# Patient Record
Sex: MALE | Race: WHITE | Employment: FULL TIME | URBAN - METROPOLITAN AREA
[De-identification: names, ages, dates, MRNs, and addresses within clinical notes are randomized per-mention and may not be internally consistent; named-entity substitution may affect disease eponyms.]

---

## 2021-01-11 ENCOUNTER — HOSPITAL ENCOUNTER (OUTPATIENT)
Dept: PHYSICAL THERAPY | Age: 36
Discharge: HOME | End: 2021-01-11
Payer: COMMERCIAL

## 2021-01-11 PROCEDURE — 97110 THERAPEUTIC EXERCISES: CPT | Performed by: PHYSICAL THERAPIST

## 2021-01-11 PROCEDURE — 97161 PT EVAL LOW COMPLEX 20 MIN: CPT | Performed by: PHYSICAL THERAPIST
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
## 2021-01-18 ENCOUNTER — HOSPITAL ENCOUNTER (OUTPATIENT)
Dept: PHYSICAL THERAPY | Age: 36
Discharge: HOME | End: 2021-01-18
Payer: COMMERCIAL

## 2021-01-18 PROCEDURE — 97140 MANUAL THERAPY 1/> REGIONS: CPT | Performed by: PHYSICAL THERAPIST

## 2021-01-18 PROCEDURE — 97110 THERAPEUTIC EXERCISES: CPT | Performed by: PHYSICAL THERAPIST

## 2021-01-18 NOTE — PROGRESS NOTES
PT DAILY TREATMENT NOTE     Patient Name: Evan Merino  Date:2021  : 1985  [x]  Patient  Verified  Payor: Wong Rey / Plan: [employer] / Product Type: HMO /    In time:1118am  Out time:1218  Total Treatment Time (min): 60  Total Timed Codes (min): 50  1:1 Treatment Time (min): 45   Visit #: 2 of     Treatment Area: Thoracic spine pain [M54.6]    SUBJECTIVE  Pain Level (0-10 scale): tight  Any medication changes, allergies to medications, adverse drug reactions, diagnosis change, or new procedure performed?: [x] No    [] Yes (see summary sheet for update)  Subjective functional status/changes:   [] No changes reported  When I stretch that LS mm it feels like it pops when I come back up. Everything is on the r side.      OBJECTIVE  Modality rationale: decrease inflammation, decrease pain, increase tissue extensibility and increase muscle contraction/control to improve the patients ability to perform functional mobility and improve activity  endurance     Min Type Additional Details    [] Estim: []Att   []Unatt  []TENS instruct                 []IFC  []Premod []NMES                       []Other:  []w/US   []w/ice   []w/heat  Position:  Location:    []  Traction: [] Cervical       []Lumbar                       [] Prone          []Supine                       []Intermittent   []Continuous Lbs:  [] before manual  [] after manual    []  Ultrasound: []Continuous   [] Pulsed                           []1MHz   []3MHz Location:  W/cm2:    []  Iontophoresis with dexamethasone         Location: [] Take home patch   [] In clinic   10 [x]  Ice     [x]  heat  []  Ice massage Position:  Location:    []  Vasopneumatic Device Pressure: [] lo [] med [] hi   Temp: [] lo [] med [] hi   [] Skin assessment post-treatment:  []intact []redness- no adverse reaction       []redness  adverse reaction:       40/35 min Therapeutic Exercise:  [] See flow sheet :   Rationale: increase ROM, increase strength and improve coordination to improve the patient’s ability to perform functional mobility and improve activity  endurance       min Therapeutic Activity:  []  See flow sheet :   Rationale:        10 min Manual Therapy:  DTM to R rhomboid and UT, LS mm. PA t/s mobes   Rationale: decrease pain, increase ROM, increase tissue extensibility, decrease trigger points and increase postural awareness to perform functional mobility and improve activity  endurance    [The manual therapy interventions were performed at a separate and distinct time from the therapeutic activities interventions]            x min Patient Education: [x] Review HEP    [] Progressed/Changed HEP based on:   [] positioning   [x] body mechanics   [] transfers   [] heat/ice application        Other Objective/Functional Measures: Initiated POC  TTP in R rhomboid and with PA to mid thoracic, R t/s mobilization limited  Added open books for t/s mobilization     Pain Level (0-10 scale) post treatment: tight1    ASSESSMENT/Changes in Function: Good tolerance to treatment today with patient req 100% verbal/tactile cueing and demo for proper form/technique with all newly introduced therex.Pt presents with decreased R t/s rotation and pain with PA to mid t/s area with increased tone to R rhomboids/mid traps/ps mm.  Patient will continue to benefit from skilled PT services to modify and progress therapeutic interventions, address functional mobility deficits, address ROM deficits, address strength deficits, analyze and address soft tissue restrictions, analyze and cue movement patterns, analyze and modify body mechanics/ergonomics and assess and modify postural abnormalities to attain remaining goals.     []  See Plan of Care  []  See progress note/recertification  []  See Discharge Summary         Progress towards goals / Updated goals:  · Short Term Goals: To be accomplished in  2  weeks:  1. Patient will be compliant with HEP for sx management to address the above  listed deficits. Pt reports compliance 1/18/2021     · Long Term Goals: To be accomplished in  8-12  treatments:  1. Patient to be independent & compliant with HEP in preparation for D/C.   2. Patient to increase FOTO score to 85 indicating improved functional abilities and QOL. 3. Patient to increase AROM of c/s to 45deg B side bending and B equal rotation to facilitate ADLS. Patient to report ability to lift children with no increase in sx in neck or t/s.     PLAN  []  Upgrade activities as tolerated     []  Continue plan of care  []  Update interventions per flow sheet       []  Discharge due to:_  []  Other:_      Saniya Davis, PT 1/18/2021  10:06 AM

## 2021-01-21 ENCOUNTER — HOSPITAL ENCOUNTER (OUTPATIENT)
Dept: PHYSICAL THERAPY | Age: 36
Discharge: HOME | End: 2021-01-21
Payer: COMMERCIAL

## 2021-01-21 PROCEDURE — 97110 THERAPEUTIC EXERCISES: CPT | Performed by: PHYSICAL THERAPIST

## 2021-01-21 PROCEDURE — 97140 MANUAL THERAPY 1/> REGIONS: CPT | Performed by: PHYSICAL THERAPIST

## 2021-01-21 PROCEDURE — 97014 ELECTRIC STIMULATION THERAPY: CPT | Performed by: PHYSICAL THERAPIST

## 2021-01-21 NOTE — PROGRESS NOTES
Request for use of Dry Needling/Intramuscular Manual Therapy  Patient: Ismael Sánchez     Referral Source: Aliyah Escalante MD  Diagnosis: Thoracic spine pain [M54.6]      : 1985  Date of initial visit: 21   Attended visits: 3  Missed Visits: 0    Based on findings from the physical therapy examination and evaluation, the evaluating therapist believes the patientIsmael  would benefit from including Dry Needling as part of the plan of care. Dry needling is a treatment technique utilized in conjunction with other PT interventions to inactivate myofascial trigger points and the pain and dysfunction they cause. Dry Needling is an advanced procedure that requires additional training including greater than 54 hours of intensive course work. Physical Therapists at [address] [place] [address] are certified through [address] [place] [address] courses for their education and are certified to perform treatments. PROCEDURE:   Solid filament sterile needle (typically 0.3mm/30 gauge) inserted into a trigger point   Repeated movements inactivate the trigger points, taking 30-60 seconds per site   Typically consists of 1 dry needling session per week and a possible second treatment including muscle re-education, flexibility, strengthening and other manual techniques to facilitate the benefits of dry needling     BENEFITS:   Inactivation of trigger points   Decreased pain   Increased muscle length   Improved movement patterns   Restoration of function POTENTIAL RISKS:   Post-needling soreness   Infection   Bruising/bleeding   Penetration of a nerve   Pneumothorax   All treating PTs have been thoroughly educated in avoiding adverse reactions    If you agree with this recommendation, please sign this form and fax it to us at [phone]. If you have questions or concerns regarding dry needling or any other treatment we may be providing, please contact us at [phone].     Thank you for allowing us to assist in the care of your patient. Halle Duarte DPT    1/21/2021 9:18 AM     NOTE TO PHYSICIAN:  PLEASE COMPLETE THE ORDERS BELOW AND   FAX TO [facility]: [phone]  If you are unable to process this request in 24 hours please contact our office:   [phone]    ? I have read the above request and AGREE to the recommendation of including dry needling as part of the plan of care. ? I have read the above request and DO NOT AGREE to including dry needling as part of the plan of care.   ? I have read the above report and request that my patient continue therapy with the following changes/special instructions:  ________________________________________________________________________    Physicians signature: _______________________________________________     Date: ______________Time:_______________

## 2021-01-21 NOTE — PROGRESS NOTES
PT DAILY TREATMENT NOTE 8    Patient Name: Rolanda Stamp  Date:2021  : 1985  [x]  Patient  Verified  Payor: Kamila  / Plan: [employer] / Product Type: HMO /    In time:750  Out time:850  Total Treatment Time (min): 60  Total Timed Codes (min): 45  1:1 Treatment Time (min): 45  Visit #: 3 of     Treatment Area: Thoracic spine pain [M54.6]    SUBJECTIVE  Pain Level (0-10 scale): tight  Any medication changes, allergies to medications, adverse drug reactions, diagnosis change, or new procedure performed?: [x] No    [] Yes (see summary sheet for update)  Subjective functional status/changes:   [] No changes reported  Pt retold subjective history.      OBJECTIVE  Modality rationale: decrease inflammation, decrease pain, increase tissue extensibility and increase muscle contraction/control to improve the patients ability to perform functional mobility and improve activity  endurance     Min Type Additional Details   15 [x] Estim: []Att   [x]Unatt  []TENS instruct                 []IFC  [x]Premod []NMES                       []Other:  []w/US   []w/ice   [x]w/heat  Position: Prone  Location: R rhomboids    []  Traction: [] Cervical       []Lumbar                       [] Prone          []Supine                       []Intermittent   []Continuous Lbs:  [] before manual  [] after manual    []  Ultrasound: []Continuous   [] Pulsed                           []1MHz   []3MHz Location:  W/cm2:    []  Iontophoresis with dexamethasone         Location: [] Take home patch   [] In clinic    [x]  Ice     [x]  heat  []  Ice massage Position:  Location:    []  Vasopneumatic Device Pressure: [] lo [] med [] hi   Temp: [] lo [] med [] hi   [] Skin assessment post-treatment:  []intact []redness- no adverse reaction       []redness  adverse reaction:       30 min Therapeutic Exercise:  [] See flow sheet : progressed PREs    Rationale: increase ROM, increase strength and improve coordination to improve the patients ability to perform functional mobility and improve activity  endurance    15 min Manual Therapy:  DTM to R rhomboid and UT, LS mm Myofascial cuppine   Rationale: decrease pain, increase ROM, increase tissue extensibility, decrease trigger points and increase postural awareness to perform functional mobility and improve activity  endurance          x min Patient Education: [x] Review HEP    [] Progressed/Changed HEP based on:   [] positioning   [x] body mechanics   [] transfers   [] heat/ice application        Other Objective/Functional Measures:    Signficant trP in the R rhomboids  Fatigue with added scapular therex    Pain Level (0-10 scale) post treatment: less [facility]    ASSESSMENT/Changes in Function:   Significant tightness in the R rhomboid reduced with manual and ESTIM interventions. Pt educated to use tennis ball for self- mobilization. Pt may benefit from IMT for deeper release if hands on is not strong enough. Patient will continue to benefit from skilled PT services to modify and progress therapeutic interventions, address functional mobility deficits, address ROM deficits, address strength deficits, analyze and address soft tissue restrictions, analyze and cue movement patterns, analyze and modify body mechanics/ergonomics and assess and modify postural abnormalities to attain remaining goals. Progress towards goals / Updated goals: · Short Term Goals: To be accomplished in  2  weeks:  1. Patient will be compliant with HEP for sx management to address the above listed deficits. Pt reports compliance 1/18/2021     · Long Term Goals: To be accomplished in  8-12  treatments:  1. Patient to be independent & compliant with HEP in preparation for D/C.   2. Patient to increase FOTO score to 85 indicating improved functional abilities and QOL. 3. Patient to increase AROM of c/s to 45deg B side bending and B equal rotation to facilitate ADLS.   4. Patient to report ability to lift children with no increase in sx in neck or t/s.  Pt reports 5/10 with lifting children 1/21/21    PLAN  []  Upgrade activities as tolerated     [x]  Continue plan of care  []  Update interventions per flow sheet       []  Discharge due to:_  [x]  Other: assess effects of manual and addition of ESTIM this session     Heena Valle, DPT 1/21/2021  918  AM

## 2021-01-25 ENCOUNTER — HOSPITAL ENCOUNTER (OUTPATIENT)
Dept: PHYSICAL THERAPY | Age: 36
Discharge: HOME | End: 2021-01-25
Payer: COMMERCIAL

## 2021-01-25 PROCEDURE — 97140 MANUAL THERAPY 1/> REGIONS: CPT | Performed by: PHYSICAL THERAPIST

## 2021-01-25 PROCEDURE — 97110 THERAPEUTIC EXERCISES: CPT | Performed by: PHYSICAL THERAPIST

## 2021-01-25 NOTE — PROGRESS NOTES
PT DAILY TREATMENT NOTE     Patient Name: Angela Bajwa  LMEV:  : 1985  [x]? Patient  Verified  Payor: Terri Lynch / Plan: Anayeli Miranda / Product Type: HMO /    In time:750  Out time:850  Total Treatment Time (min): 60  Total Timed Codes (min): 45  1:1 Treatment Time (min): 45  Visit #: 4 of      Treatment Area: Thoracic spine pain [M54.6]     SUBJECTIVE  Pain Level (0-10 scale): tight  Any medication changes, allergies to medications, adverse drug reactions, diagnosis change, or new procedure performed?: [x] No    [] Yes (see summary sheet for update)  Subjective functional status/changes:   [] No changes reported  I feel like it gets better but then it comes right back.     OBJECTIVE  Modality rationale: decrease inflammation, decrease pain, increase tissue extensibility and increase muscle contraction/control to improve the patients ability to perform functional mobility and improve activity  endurance     Min Type Additional Details    [x] Estim: []Att   [x]Unatt  []TENS instruct                 []IFC  [x]Premod []NMES                       []Other:  []w/US   []w/ice   [x]w/heat  Position: Prone  Location: R rhomboids    []  Traction: [] Cervical       []Lumbar                       [] Prone          []Supine                       []Intermittent   []Continuous Lbs:  [] before manual  [] after manual    []  Ultrasound: []Continuous   [] Pulsed                           []1MHz   []3MHz Location:  W/cm2:    []  Iontophoresis with dexamethasone         Location: [] Take home patch   [] In clinic   10 [x]  Ice     [x]  heat  []  Ice massage Position:  Location:    []  Vasopneumatic Device Pressure: [] lo [] med [] hi   Temp: [] lo [] med [] hi   [] Skin assessment post-treatment:  []intact []redness- no adverse reaction       []redness  adverse reaction:       40/35 min Therapeutic Exercise:  [] See flow sheet : progressed PREs    Rationale: increase ROM, increase strength and improve coordination to improve the patients ability to perform functional mobility and improve activity  endurance    10 min Manual Therapy:  DTM to R rhomboid and UT, LS mm, PA t/s mobes, B facet mobes, rotation mobes to mid thoracic, c/s manual traction for C7 decompression   Rationale: decrease pain, increase ROM, increase tissue extensibility, decrease trigger points and increase postural awareness to perform functional mobility and improve activity  endurance          x min Patient Education: [x] Review HEP    [] Progressed/Changed HEP based on:   [] positioning   [x] body mechanics   [] transfers   [] heat/ice application        Other Objective/Functional Measures: Added SA press,  added bird dogs  Assessed throwing at mini tramp with no increase in pain but mod tightness noted. TTP at ~T4 over central spinous process and R rhomboid lateral to T4    Pain Level (0-10 scale) post treatment: tight    ASSESSMENT/Changes in Function:   Pt reports decreased pain after therapy treatments, however he reports pain and tightness comes back after a few hours. Patient will continue to benefit from skilled PT services to modify and progress therapeutic interventions, address functional mobility deficits, address ROM deficits, address strength deficits, analyze and address soft tissue restrictions, analyze and cue movement patterns, analyze and modify body mechanics/ergonomics and assess and modify postural abnormalities to attain remaining goals. Progress towards goals / Updated goals: · Short Term Goals: To be accomplished in  2  weeks:  1. Patient will be compliant with HEP for sx management to address the above listed deficits. Pt reports compliance 1/18/2021     · Long Term Goals: To be accomplished in  8-12  treatments:  1. Patient to be independent & compliant with HEP in preparation for D/C.   2. Patient to increase FOTO score to 85 indicating improved functional abilities and QOL.    3. Patient to increase AROM of c/s to 45deg B side bending and B equal rotation to facilitate ADLS. 4. Patient to report ability to lift children with no increase in sx in neck or t/s.  Pt reports 5/10 with lifting children 1/21/21, Pt reports continued pain with lifting children over the weekend 1/25/21    PLAN  []  Upgrade activities as tolerated     [x]  Continue plan of care  []  Update interventions per flow sheet       []  Discharge due to:_  [x]  Other: add back in e-stim NV if indicated    Katelyn Sheikh, PT 1/25/2021  918  AM

## 2021-01-28 ENCOUNTER — HOSPITAL ENCOUNTER (OUTPATIENT)
Dept: PHYSICAL THERAPY | Age: 36
Discharge: HOME | End: 2021-01-28
Payer: COMMERCIAL

## 2021-01-28 PROCEDURE — 97140 MANUAL THERAPY 1/> REGIONS: CPT

## 2021-01-28 PROCEDURE — 20561 NDL INSJ W/O NJX 3+ MUSC: CPT

## 2021-01-28 PROCEDURE — 97110 THERAPEUTIC EXERCISES: CPT

## 2021-01-28 NOTE — PROGRESS NOTES
PT DAILY TREATMENT NOTE     Patient Name: Bri Nava  Date:2021  : 1985  [x]  Patient  Verified  Payor: Nolan Keller / [facility] / Product Type: HMO /    In time:944  Out time:1043  Total Treatment Time (min): 59  Visit #: 5 of     Medicare/BCBS Only   Total Timed Codes (min):  42 1:1 Treatment Time:  49       Treatment Area: Thoracic spine pain [M54.6]    SUBJECTIVE  Pain Level (0-10 scale): 3-4/10  Any medication changes, allergies to medications, adverse drug reactions, diagnosis change, or new procedure performed?: [x] No    [] Yes (see summary sheet for update)  Subjective functional status/changes:   [] No changes reported  Ever since I was here the other day it feels like I'm going in the right direction.      OBJECTIVE    Modality rationale: decrease edema, decrease inflammation and decrease pain to improve the patients ability to reduce soreness following needling   Min Type Additional Details    [] Estim:  []Unatt       []IFC  []Premod                        []Other:  []w/ice   []w/heat  Position:  Location:    [] Estim: []Att    []TENS instruct  []NMES                    []Other:  []w/US   []w/ice   []w/heat  Position:  Location:    []  Traction: [] Cervical       []Lumbar                       [] Prone          []Supine                       []Intermittent   []Continuous Lbs:  [] before manual  [] after manual    []  Ultrasound: []Continuous   [] Pulsed                           []1MHz   []3MHz W/cm2:  Location:    []  Iontophoresis with dexamethasone         Location: [] Take home patch   [] In clinic   10 [x]  Ice     []  heat  []  Ice massage  []  Laser   []  Anodyne Position:prone  Location:upper back/Right UT    []  Laser with stim  []  Other:  Position:  Location:    []  Vasopneumatic Device Pressure:       [] lo [] med [] hi   Temperature: [] lo [] med [] hi   [x] Skin assessment post-treatment:  [x]intact []redness- no adverse reaction    []redness  adverse reaction:     32 min Therapeutic Exercise:  [x] See flow sheet :updated HEP, education on dry needling methods and objectives, management following dry needling   Rationale: increase ROM and increase strength to improve the patients ability to improve ease of ADLs, job tasks    10 min Manual Therapy:  Prone STM/TrP release to Right rhomboids/levator scapulae/UT, PA mobs to thoracic spine at midline and facets    The manual therapy interventions were performed at a separate and distinct time from the therapeutic activities interventions. Rationale: decrease pain, increase ROM, increase tissue extensibility and decrease trigger points to improve ease of turning head        7 min Dry Needling:   []  CPT 67178:  needle insertion(s) without injection(s); 1 or 2 muscle(s)  [x]  CPT 35795:  needle insertion(s) without injection(s); 3 or more muscles. Rationale: decrease pain, increase ROM, increase tissue extensibility and decrease trigger points to improve ease of lifting tasks    Dry Needling Procedure Note    Procedure: An intramuscular manual therapy (dry needling) and a neuro-muscular re-education treatment was done to deactivate myofascial trigger points with a 30 gauge filament needle under aseptic technique. Indications:  [x] Myofascial pain and dysfunction [] Muscled spasms  [x] Myalgia/myositis   [] Muscle cramps  [x] Muscle imbalances  [] Temporomandibular Dysfunction  [] Other:    Chart reviewed for the following:  Efe LEE PT, have reviewed the medical history, summary list and precautions/contraindications for [employer].   TIME OUT performed immediately prior to start of procedure:  Efe LEE PT, have performed the following reviews on [employer] prior to the start of the session:      [x] Verified patient identification by name and date of birth    [x] Agreement on all muscles being treated was verified   [x] Purpose of dry needling, side effects, possible complications, risks and benefits were explained to the patient   [x] Procedure site(s) verified  [x] Patient was positioned for comfort and draped for privacy  [x] Informed Consent was signed (initial visit) and verified verbally (subsequent visits)  [x] Patient was instructed on the need to report the use of blood thinners and/or immunosuppressant medications  [x] How to respond to possible adverse effects of treatment  [x] Self treatment of post needling soreness: ice, heat (moist heat, heat wraps) and stretching  [x] Opportunity was given to ask any questions, all questions were answered            Time: 950  Date of procedure: 1/28/2021  Treatment: The following muscles were treated today with intramuscular dry needling  [] Left [] Right Masster  [] Left [] Right Temporalis  [] Left [] Right Zygomaticus Major / Minor  [] Left [] Right Lateral Pterygoid  [] Left [] Right Medial Pterygoid  [] Left [] Right Digastric Post / Anterior Belly  [] Left [] Right Sternocleidomastoid  [] Left [] Right Scalene Anterior / Medial / Posterior  [] Left [] Right Extra Laryngeal Muscles  [] Left [x] Right Upper Trapezius  [] Left [] Right Middle Trapezius  [] Left [] Right Lower Trapezius  [] Left [] Right Oblique Capitis Inferior  [] Left [] Right Splenius Capitis / Cervicis  [] Left [] Right Semispinalis: Capitis / Cervicis  [] Left [] Right Multifidi / Rotatores Cervicis / Thoracic  [] Left [] Right Longissimus Thoracis / Illiocostalis  [] Left [x] Right Levator Scapulae  [] Left [] Right Supraspinatus / Infraspinatus  [] Left [] Right Teres Major / Minor  [] Left [x] Right Rhomboids / Serratus posterior superior  [] Left [] Right Pectoralis Major / Minor  [] Left [] Right Serratus Anterior  [] Left [] Right Latissimus Dorsi  [] Left [] Right Subscapularis  [] Left [] Right Coracobrachialis  [] Left [] Right Biceps Brachii  [] Left [] Right Deltoid: Anterior / Medial / Posterior  [] Left [] Right Brachialis  [] Left [] Right Triceps  [] Left [] Right Brachioradialis  [] Left [] Right Extensor Carpi Radialis Brevis / Extensor Carpi Radialis Longus    [] Left [] Right  Extensor digitorum  [] Left [] Right Supinator / Pronator Teres  [] Left [] Right Flexor Carpi Radialis/ Flexor Carpi Ulnaris   [] Left [] Right  Flexor Digitorum Superficialis/ Flexor Digitorum Profundus  [] Left [] Right Flexor Pollicis Longus / Flexor Pollicis Brevis / Palmaris Longus  [] Left [] Right Abductor Pollicis Longus / Abductor Pollicis Brevis  [] Left [] Right Opponens Pollicis / Adductor Pollicis  [] Left [] Right Dorsal / Palmar Interossei / Lumbricalis  [] Left [] Right Abductor Digiti Minimi / Opponens Digiti Minimi    Patient's response to today's treatment:  [x] Latent Twitch Response     [x] Muscle relaxation [] Pain Relief  [x] Post needling soreness    [] without complications  [] Increased Range of Motion   [] Decreased headaches    [] Decreased Tinnitus  [] Other:     Performed by: Angela Salcido, PT    With   [] TE   [] TA   [] neuro   [] other: Patient Education: [x] Review HEP    [] Progressed/Changed HEP based on:   [] positioning   [] body mechanics   [] transfers   [] heat/ice application    [] other:      Other Objective/Functional Measures: initiated dry needling per flow sheet     Pain Level (0-10 scale) post treatment: 6/10    ASSESSMENT/Changes in Function: Patient responded well to dry needling, with several latent twitch responses when needling the upper trapezius and levator scapulae. Education on drinking lots of water and avoiding heavy lifting, but keeping upper body moving. Issued updated HEP to assist with thoracic mobilization. Patient instructed to call with any questions or concerns; Patient in agreement.     Patient will continue to benefit from skilled PT services to modify and progress therapeutic interventions, address functional mobility deficits, address ROM deficits, address strength deficits, analyze and address soft tissue restrictions, analyze and cue movement patterns, analyze and modify body mechanics/ergonomics, assess and modify postural abnormalities, address imbalance/dizziness and instruct in home and community integration to attain remaining goals. []  See Plan of Care  []  See progress note/recertification  []  See Discharge Summary         Progress towards goals / Updated goals: · Short Term Goals: To be accomplished in  2  weeks:  1. Patient will be compliant with HEP for sx management to address the above listed deficits. Pt reports compliance 1/18/2021     · Long Term Goals: To be accomplished in  8-12  treatments:  1. Patient to be independent & compliant with HEP in preparation for D/C. Compliant with initial HEP, will update in the coming visits 1/28/21  2. Patient to increase FOTO score to 85 indicating improved functional abilities and QOL. Reassess at MD note  3. Patient to increase AROM of c/s to 45deg B side bending and B equal rotation to facilitate ADLS. Progressing, sidebend Right 40 deg Left 25 deg Right 60 deg Left 55 deg 1/28/21  4. Patient to report ability to lift children with no increase in sx in neck or t/s.  Pt reports 5/10 with lifting children 1/21/21, Pt reports continued pain with lifting children over the weekend 1/25/21    PLAN  [x]  Upgrade activities as tolerated     [x]  Continue plan of care  [x]  Update interventions per flow sheet       []  Discharge due to:_  []  Other:_      Marichuy Meadows, PT 1/28/2021  9:43 AM    Future Appointments   Date Time Provider Jordyn Ennis   1/28/2021  9:45 AM Cassy Hook PT [facility]   2/1/2021 11:30 AM Lois Hawkins PTA [facility]   2/4/2021  3:30 PM Lois Hawkins PTA [facility]   2/8/2021 11:30 AM Cassy Hook PT [facility]   2/11/2021  3:30 PM Cameron Walker PTA [facility]

## 2021-02-01 ENCOUNTER — HOSPITAL ENCOUNTER (OUTPATIENT)
Dept: PHYSICAL THERAPY | Age: 36
Discharge: HOME | End: 2021-02-01
Payer: COMMERCIAL

## 2021-02-01 PROCEDURE — 97140 MANUAL THERAPY 1/> REGIONS: CPT

## 2021-02-01 PROCEDURE — 97110 THERAPEUTIC EXERCISES: CPT

## 2021-02-01 NOTE — PROGRESS NOTES
PT DAILY TREATMENT NOTE     Patient Name: Erum Max  SKWF:[id number]  : 1985  [x]?   Patient  Verified  Payor: Marylu Champagne / Plan: Jessica Posey / Product Type: HMO /    In time:11:30  Out time:12:34  Total Treatment Time (min): 64  Total Timed Codes (min): 49    1:1 Treatment Time (min): 44  Visit #: 4 of      Treatment Area: Thoracic spine pain [M54.6]     SUBJECTIVE  Pain Level (0-10 scale): tight  Any medication changes, allergies to medications, adverse drug reactions, diagnosis change, or new procedure performed?: [x] No    [] Yes (see summary sheet for update)  Subjective functional status/changes:   [] No changes reported  I really think the needling helped because I was able to do some stuff around the house the day after but then last night I was laying on my back and I tried to  my one year up - holding him above my face I could feel that pain between my shoulder blades     OBJECTIVE  Modality rationale: decrease inflammation, decrease pain, increase tissue extensibility and increase muscle contraction/control to improve the patients ability to perform functional mobility and improve activity  endurance     Min Type Additional Details    [] Estim: []Att   [x]Unatt  []TENS instruct                 []IFC  [x]Premod []NMES                       []Other:  []w/US   []w/ice   [x]w/heat  Position: Prone  Location: R rhomboids    []  Traction: [] Cervical       []Lumbar                       [] Prone          []Supine                       []Intermittent   []Continuous Lbs:  [] before manual  [] after manual    []  Ultrasound: []Continuous   [] Pulsed                           []1MHz   []3MHz Location:  W/cm2:    []  Iontophoresis with dexamethasone         Location: [] Take home patch   [] In clinic   10+5 set up [x]  Ice     [x]  heat  []  Ice massage Position: in supine   Location: to thoracic spine     []  Vasopneumatic Device Pressure: [] lo [] med [] hi   Temp: [] lo [] med [] hi   [x] Skin assessment post-treatment:  [x]intact [x]redness- no adverse reaction       []redness  adverse reaction:       34/29 min Therapeutic Exercise:  [x] See flow sheet : progressed PREs    Rationale: increase ROM, increase strength and improve coordination to improve the patients ability to perform functional mobility and improve activity  endurance    15 min Manual Therapy:   PA t/s mobes in prone, B facet mobes, rotation mobes to mid thoracic in sitting for approx T4 rotation lesion to the (R) side.  And  Then AP mob with patient in supine  Cupping for 5 minutes to the thoracic paraspinals after manual and before heat      Rationale: decrease pain, increase ROM, increase tissue extensibility, decrease trigger points and increase postural awareness to perform functional mobility and improve activity  endurance          x min Patient Education: [x] Review HEP    [] Progressed/Changed HEP based on:   [] positioning   [x] body mechanics   [] transfers   [] heat/ice application        Other Objective/Functional Measures:       Pain Level (0-10 scale) post treatment: tight    ASSESSMENT/Changes in Function:   Noted a audible cavitation with both supine and prone mobs to thoracic spine  And tolerate MET to correct approx T4 rotation lesion - noted to the (R) side - however tolerated the MET to the right side better than away from the right side - placing patient into forward thoracic flexion with passive (L) side bending with (L) rotation causing some increase pain however decrease pain with MET in the opposite direction  After manual patient was able to lay supine and take 25# weight from hips to directly above face without any thoracic pain that he experienced last night when performing the same activity with lifting up his son       Patient will continue to benefit from skilled PT services to modify and progress therapeutic interventions, address functional mobility deficits, address ROM deficits, address strength deficits, analyze and address soft tissue restrictions, analyze and cue movement patterns, analyze and modify body mechanics/ergonomics and assess and modify postural abnormalities to attain remaining goals. Progress towards goals / Updated goals: · Short Term Goals: To be accomplished in  2  weeks:  1. Patient will be compliant with HEP for sx management to address the above listed deficits. Pt reports compliance 1/18/2021     · Long Term Goals: To be accomplished in  8-12  treatments:  1. Patient to be independent & compliant with HEP in preparation for D/C.   2. Patient to increase FOTO score to 85 indicating improved functional abilities and QOL. 3. Patient to increase AROM of c/s to 45deg B side bending and B equal rotation to facilitate ADLS. 4. Patient to report ability to lift children with no increase in sx in neck or t/s.  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

## 2021-02-04 ENCOUNTER — APPOINTMENT (OUTPATIENT)
Dept: PHYSICAL THERAPY | Age: 36
End: 2021-02-04
Payer: COMMERCIAL

## 2021-02-05 ENCOUNTER — HOSPITAL ENCOUNTER (OUTPATIENT)
Dept: PHYSICAL THERAPY | Age: 36
Discharge: HOME | End: 2021-02-05
Payer: COMMERCIAL

## 2021-02-05 PROCEDURE — 97014 ELECTRIC STIMULATION THERAPY: CPT | Performed by: PHYSICAL THERAPIST

## 2021-02-05 PROCEDURE — 97140 MANUAL THERAPY 1/> REGIONS: CPT | Performed by: PHYSICAL THERAPIST

## 2021-02-05 PROCEDURE — 20561 NDL INSJ W/O NJX 3+ MUSC: CPT | Performed by: PHYSICAL THERAPIST

## 2021-02-05 PROCEDURE — 97110 THERAPEUTIC EXERCISES: CPT | Performed by: PHYSICAL THERAPIST

## 2021-02-05 NOTE — PROGRESS NOTES
PT DAILY TREATMENT NOTE - [facility]     Patient Name: Evan Merino  QUSM:[id number]  : 1985  [x]  Patient  Verified  Payor: Wong Rey / Plan: [employer] / Product Type: HMO /    In time:702  Out time:804  Total Treatment Time (min): 62  Total Timed Codes (min): 37  1:1 Treatment Time ( [address] only): 52   Visit #: 7 of     Treatment Area: Thoracic spine pain [M54.6]    SUBJECTIVE  Pain Level (0-10 scale): tight  Any medication changes, allergies to medications, adverse drug reactions, diagnosis change, or new procedure performed?: [x] No    [] Yes (see summary sheet for update)  Subjective functional status/changes:   [] No changes reported  I feel less tight than I have been. I don't want to jinx it.       OBJECTIVE    Modality rationale: decrease pain and increase tissue extensibility to improve the patients ability to improve post session soreness    Min Type Additional Details   15 [x] Estim: []Att   [x]Unatt        []TENS instruct                  []IFC  [x]Premod   []NMES                     []Other:  []w/US   []w/ice   [x]w/heat  Position: prone  Location: TS    []  Traction: [] Cervical       []Lumbar                       [] Prone          []Supine                       []Intermittent   []Continuous Lbs:  [] before manual  [] after manual    []  Ultrasound: []Continuous   [] Pulsed                           []1MHz   []3MHz Location:  W/cm2:    []  Iontophoresis with dexamethasone         Location: [] Take home patch   [] In clinic    []  Ice     []  heat  []  Ice massage Position:  Location:    []  Laser  []  Other: Position:  Location:    []  Vasopneumatic Device Pressure:       [] lo [] med [] hi   Temperature: [] lo [] med [] hi   [] Skin assessment post-treatment:  []intact []redness- no adverse reaction    []redness  adverse reaction:     27 min Therapeutic Exercise:  [] See flow sheet :   Rationale: increase ROM and increase strength to improve the patients ability to improve functional mobility     10 min Manual Therapy: Technique:      [] S/DTM []IASTM []PROM [] Passive Stretching   [] Graston:  [] GT 1  [] GT 2 [] GT 3 [] GT 4 [] GT 4 [] GT 5  [] GT 6  [] Sweep [] Fan [] [place]  [] Brush   []  Swivel []J- Stroke [] Scoop []IFraming     []Manual TPR  [] TDN (see below)  []Jt manipulation:Gr I [] II []  III [] IV[] V[]  Treatment/Area:        CPT 45682:  needle insertion(s) without injection(s); 3 or more muscles. 10 min   Rationale:      decrease pain, increase ROM, increase tissue extensibility and decrease trigger points to improve patient's ability to improve ability to lift children without pain        With   [] TE   [] TA   [] neuro   [] other: Patient Education: [x] Review HEP    [] Progressed/Changed HEP based on:   [] positioning   [] body mechanics   [] transfers   [] heat/ice application    [] Graston Education: Explained the effects and benefits of Graston Technique therapy including potential for post treatment soreness and bruising. [] Other:      Dry Needling Procedure Note    Dry Needle Session Number:  2    Procedure: An intramuscular manual therapy (dry needling) and a neuro-muscular re-education treatment was done to deactivate myofascial trigger points, with a 15/30 gauge solid filament needle, under aseptic technique. Indication(s): [x] Muscle spasms [x] Myalgia/Myositis  [] Muscle cramps      [x] Muscle imbalances [] TMD (TMJ) [] Myofascial pain & dysfunction     [] Other: __    Chart reviewed for the following:  Lamar LEE DPT, have reviewed the medical history, summary list and precautions/contraindications for [employer].     TIME OUT performed immediately prior to start of procedure:  749 AM (enter time the timeout was completed)  Lamar LEE DPT, have performed the following reviews on [employer] prior to the start of the session:      [x] Patient was identified by name and date of birth    [x] Agreement on all muscles being treated was verified   [x] Purpose of dry needling, side effects, possible complications, risks and benefits were explained to the patient   [x] Procedure site(s) verified  [x] Patient was positioned for comfort and draped for privacy  [x] Informed Consent was signed (initial visit) and verified verbally (subsequent visits)  [x] Patient was instructed on the need to report the use of blood thinners and/or immunosuppressant medications  [x] How to respond to possible adverse effects of treatment  [x] Self treatment of post needling soreness: ice, heat (moist heat, heat wraps) and stretching  [x] Opportunity was given to ask any questions, all questions were answered            Treatment:  The following muscles were treated today:    Right: Rhomboids, LS, UT, [place]   Left:      Patients response to todays treatment:   [x]  LTRs  [x]  Muscle Relaxation  []  Pain Relief  []  Decreased Tinnitus  []  Decreased HAs [x]  Post needling soreness []  Increased ROM   []  Other:      Other Objective/Functional Measures:    Noted TrPs in the above needled muscles      Pain Level (0-10 scale) post treatment: sore    ASSESSMENT/Changes in Function:   Pt tolerated all exercises without increased pain. He was sore post IMT this session as he responded + to treatment. Continue scapular stabilization exercises as tolerated to progress towards long-term goals. Patient will continue to benefit from skilled PT services to modify and progress therapeutic interventions, address functional mobility deficits, address ROM deficits, address strength deficits, analyze and address soft tissue restrictions, assess and modify postural abnormalities and address imbalance/dizziness to attain remaining goals. []  See Plan of Care  []  See progress note/recertification  []  See Discharge Summary         Progress towards goals / Updated goals: · Short Term Goals: To be accomplished in  2  weeks:  1.  Patient will be compliant with HEP for sx management to address the above listed deficits. Pt reports compliance 1/18/2021     · Long Term Goals: To be accomplished in  8-12  treatments:  1. Patient to be independent & compliant with HEP in preparation for D/C.   2. Patient to increase FOTO score to 85 indicating improved functional abilities and QOL. 3. Patient to increase AROM of c/s to 45deg B side bending and B equal rotation to facilitate ADLS. 4. Patient to report ability to lift children with no increase in sx in neck or t/s.  Continued pain 2/5/21    PLAN  []  Upgrade activities as tolerated     [x]  Continue plan of care  []  Update interventions per flow sheet       []  Discharge due to:_  []  Other:_      Flakita Alcantara DPT 2/5/2021  818  AM

## 2021-02-08 ENCOUNTER — HOSPITAL ENCOUNTER (OUTPATIENT)
Dept: PHYSICAL THERAPY | Age: 36
Discharge: HOME | End: 2021-02-08
Payer: COMMERCIAL

## 2021-02-08 PROCEDURE — 97140 MANUAL THERAPY 1/> REGIONS: CPT | Performed by: PHYSICAL THERAPIST

## 2021-02-08 PROCEDURE — 97014 ELECTRIC STIMULATION THERAPY: CPT | Performed by: PHYSICAL THERAPIST

## 2021-02-08 PROCEDURE — 97110 THERAPEUTIC EXERCISES: CPT | Performed by: PHYSICAL THERAPIST

## 2021-02-08 NOTE — PROGRESS NOTES
PT DAILY TREATMENT NOTE     Patient Name: Karon Kellogg  OFUS:  : 1985  [x]? Patient  Verified  Payor: Porsha Hernandez / Plan: Ryanne Gonzalez / Product Type: HMO /    In time: 1145am  Out time: 100pm  Total Treatment Time (min): 75min  Total Timed Codes (min): 60    1:1 Treatment Time (min): 55  Visit #: 6 of      Treatment Area: Thoracic spine pain [M54.6]     SUBJECTIVE  Pain Level (0-10 scale): tight  Any medication changes, allergies to medications, adverse drug reactions, diagnosis change, or new procedure performed?: [x] No    [] Yes (see summary sheet for update)  Subjective functional status/changes:   [] No changes reported  Pt reports needling makes him sore the next day but then he feels better the day after. He reports overall decreased pain but tightness still comes back.      OBJECTIVE  Modality rationale: decrease inflammation, decrease pain, increase tissue extensibility and increase muscle contraction/control to improve the patients ability to perform functional mobility and improve activity  endurance     Min Type Additional Details   15 [x] Estim: []Att   [x]Unatt  []TENS instruct                 []IFC  [x]Premod []NMES                       []Other:  []w/US   []w/ice   [x]w/heat  Position: Prone  Location: R rhomboids    []  Traction: [] Cervical       []Lumbar                       [] Prone          []Supine                       []Intermittent   []Continuous Lbs:  [] before manual  [] after manual    []  Ultrasound: []Continuous   [] Pulsed                           []1MHz   []3MHz Location:  W/cm2:    []  Iontophoresis with dexamethasone         Location: [] Take home patch   [] In clinic    [x]  Ice     [x]  heat  []  Ice massage Position: in supine   Location: to thoracic spine     []  Vasopneumatic Device Pressure: [] lo [] med [] hi   Temp: [] lo [] med [] hi   [x] Skin assessment post-treatment:  [x]intact [x]redness- no adverse reaction       []redness  adverse reaction:       50/45 min Therapeutic Exercise:  [x] See flow sheet : progressed PREs    Rationale: increase ROM, increase strength and improve coordination to improve the patients ability to perform functional mobility and improve activity  endurance    10 min Manual Therapy:   PA t/s mobes in prone, B facet mobes, rotation mobes to mid thoracic in sitting for approx T4 rotation lesion to the (R) side. And  Then AP mob with patient in supine  Cupping for 5 minutes to the thoracic paraspinals after manual and before heat      Rationale: decrease pain, increase ROM, increase tissue extensibility, decrease trigger points and increase postural awareness to perform functional mobility and improve activity  endurance          x min Patient Education: [x] Review HEP    [] Progressed/Changed HEP based on:   [] positioning   [x] body mechanics   [] transfers   [] heat/ice application        Other Objective/Functional Measures:   TTP over B T4 ps mm R>L  Added CC therex for progression. Pain Level (0-10 scale) post treatment: 1    ASSESSMENT/Changes in Function:   Pt continues with mm tension surrounding the T4 area on R (mod), and mild mm tension in L ps mm today. Continues with TTP with R facet mobes, PA mobes at ~T4/T5. Pt reports pain relief is lasting for ~2days post treatment, whereas it was 1 day last week. Patient will continue to benefit from skilled PT services to modify and progress therapeutic interventions, address functional mobility deficits, address ROM deficits, address strength deficits, analyze and address soft tissue restrictions, analyze and cue movement patterns, analyze and modify body mechanics/ergonomics and assess and modify postural abnormalities to attain remaining goals. Progress towards goals / Updated goals: · Short Term Goals: To be accomplished in  2  weeks:  1. Patient will be compliant with HEP for sx management to address the above listed deficits.   Pt reports compliance 1/18/2021     · Long Term Goals: To be accomplished in  8-12  treatments:  1. Patient to be independent & compliant with HEP in preparation for D/C. MET, 2/8/21  2. Patient to increase FOTO score to 85 indicating improved functional abilities and QOL. 3. Patient to increase AROM of c/s to 45deg B side bending and B equal rotation to facilitate ADLS. 4. Patient to report ability to lift children with no increase in sx in neck or t/s. Pt reports 5/10 with lifting children 1/21/21, Pt reports continued pain with lifting children over the weekend 1/25/21    PLAN  []  Upgrade activities as tolerated     [x]  Continue plan of care  []  Update interventions per flow sheet       []  Discharge due to:_  [x]  Other: PN NV with possible reduction to 1x/wk then place chart on hold for 30 days to develop I in HEP sx management.      Vin Alas, PT 2/8/2021  918  AM

## 2021-02-11 ENCOUNTER — APPOINTMENT (OUTPATIENT)
Dept: PHYSICAL THERAPY | Age: 36
End: 2021-02-11
Payer: COMMERCIAL

## 2021-02-12 ENCOUNTER — HOSPITAL ENCOUNTER (OUTPATIENT)
Dept: PHYSICAL THERAPY | Age: 36
Discharge: HOME | End: 2021-02-12
Payer: COMMERCIAL

## 2021-02-12 PROCEDURE — 97110 THERAPEUTIC EXERCISES: CPT

## 2021-02-12 PROCEDURE — 97140 MANUAL THERAPY 1/> REGIONS: CPT

## 2021-02-12 PROCEDURE — 20561 NDL INSJ W/O NJX 3+ MUSC: CPT

## 2021-02-12 PROCEDURE — 97014 ELECTRIC STIMULATION THERAPY: CPT
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
## 2021-02-22 ENCOUNTER — HOSPITAL ENCOUNTER (OUTPATIENT)
Dept: PHYSICAL THERAPY | Age: 36
Discharge: HOME | End: 2021-02-22
Payer: COMMERCIAL

## 2021-02-22 PROCEDURE — 97140 MANUAL THERAPY 1/> REGIONS: CPT

## 2021-02-22 PROCEDURE — 20561 NDL INSJ W/O NJX 3+ MUSC: CPT

## 2021-02-22 PROCEDURE — 97112 NEUROMUSCULAR REEDUCATION: CPT

## 2021-02-22 PROCEDURE — 97014 ELECTRIC STIMULATION THERAPY: CPT

## 2021-02-22 NOTE — PROGRESS NOTES
PT DAILY TREATMENT NOTE 11    Patient Name: Santo Alvarado  Date:2021  : 1985  [x]  Patient  Verified  Payor: Sanjeev Soares / Plan: Robb Wilkerson / Product Type: HMO /    In time:916  Out time:1016  Total Treatment Time (min): 60  Visit #: 1 of 4-8    Medicare/BCBS Only   Total Timed Codes (min):  45 1:1 Treatment Time:  40       Treatment Area: Thoracic spine pain [M54.6]    SUBJECTIVE  Pain Level (0-10 scale): 1-2/10  Any medication changes, allergies to medications, adverse drug reactions, diagnosis change, or new procedure performed?: [x] No    [] Yes (see summary sheet for update)  Subjective functional status/changes:   [] No changes reported  The past two weeks is the best it's felt. It's not pain it's just tight.      OBJECTIVE    Modality rationale: decrease pain and increase tissue extensibility to improve the patients ability to reduce soreness after exercises    Min Type Additional Details   15 [x] Estim:  [x]Unatt       []IFC  [x]Premod                        []Other:  []w/ice   [x]w/heat  Position:prone  Location:bilateral UT/thoracic spine    [] Estim: []Att    []TENS instruct  []NMES                    []Other:  []w/US   []w/ice   []w/heat  Position:  Location:    []  Traction: [] Cervical       []Lumbar                       [] Prone          []Supine                       []Intermittent   []Continuous Lbs:  [] before manual  [] after manual    []  Ultrasound: []Continuous   [] Pulsed                           []1MHz   []3MHz W/cm2:  Location:    []  Iontophoresis with dexamethasone         Location: [] Take home patch   [] In clinic    []  Ice     []  heat  []  Ice massage  []  Laser   []  Anodyne Position:  Location:    []  Laser with stim  []  Other:  Position:  Location:    []  Vasopneumatic Device Pressure:       [] lo [] med [] hi   Temperature: [] lo [] med [] hi   [x] Skin assessment post-treatment:  [x]intact []redness- no adverse reaction    []redness  adverse reaction: 29 min Neuromuscular Re-education:  [x]  See flow sheet :   Rationale: increase strength, improve coordination and increase proprioception  to improve the patients ability to improve scapular stability with functional tasks    10 min Manual Therapy:  Prone STM/TPR to Right UT/rhomboids/levator scapulae    The manual therapy interventions were performed at a separate and distinct time from the therapeutic activities interventions. Rationale: decrease pain, increase ROM, increase tissue extensibility and decrease trigger points to reduce pain with lifting     6 min Dry Needling:   []  CPT 84797:  needle insertion(s) without injection(s); 1 or 2 muscle(s)  [x]  CPT 92468:  needle insertion(s) without injection(s); 3 or more muscles. Rationale: decrease pain, increase ROM, increase tissue extensibility and decrease trigger points to improve mechanics with functional tasks    Dry Needling Procedure Note    Procedure: An intramuscular manual therapy (dry needling) and a neuro-muscular re-education treatment was done to deactivate myofascial trigger points with a 30 gauge filament needle under aseptic technique. Indications:  [x] Myofascial pain and dysfunction [] Muscled spasms  [x] Myalgia/myositis   [] Muscle cramps  [x] Muscle imbalances  [] Temporomandibular Dysfunction  [] Other:    Chart reviewed for the following:  Maikel LEE PT, have reviewed the medical history, summary list and precautions/contraindications for [employer].   TIME OUT performed immediately prior to start of procedure:  Maikel LEE PT, have performed the following reviews on [employer] prior to the start of the session:      [x] Verified patient identification by name and date of birth    [x] Agreement on all muscles being treated was verified   [x] Purpose of dry needling, side effects, possible complications, risks and benefits were explained to the patient   [x] Procedure site(s) verified  [x] Patient was positioned for comfort and draped for privacy  [x] Informed Consent was signed (initial visit) and verified verbally (subsequent visits)  [x] Patient was instructed on the need to report the use of blood thinners and/or immunosuppressant medications  [x] How to respond to possible adverse effects of treatment  [x] Self treatment of post needling soreness: ice, heat (moist heat, heat wraps) and stretching  [x] Opportunity was given to ask any questions, all questions were answered            Time: 920  Date of procedure: 2/22/2021  Treatment: The following muscles were treated today with intramuscular dry needling  [] Left [] Right Masster  [] Left [] Right Temporalis  [] Left [] Right Zygomaticus Major / Minor  [] Left [] Right Lateral Pterygoid  [] Left [] Right Medial Pterygoid  [] Left [] Right Digastric Post / Anterior Belly  [] Left [] Right Sternocleidomastoid  [] Left [] Right Scalene Anterior / Medial / Posterior  [] Left [] Right Extra Laryngeal Muscles  [] Left [x] Right Upper Trapezius  [] Left [] Right Middle Trapezius  [] Left [] Right Lower Trapezius  [] Left [] Right Oblique Capitis Inferior  [] Left [] Right Splenius Capitis / Cervicis  [] Left [] Right Semispinalis: Capitis / Cervicis  [] Left [x] Right Multifidi / Rotatores Cervicis / Thoracic  [] Left [] Right Longissimus Thoracis / Illiocostalis  [] Left [] Right Levator Scapulae  [] Left [] Right Supraspinatus / Infraspinatus  [] Left [] Right Teres Major / Minor  [] Left [x] Right Rhomboids / Serratus posterior superior  [] Left [] Right Pectoralis Major / Minor  [] Left [] Right Serratus Anterior  [] Left [] Right Latissimus Dorsi  [] Left [] Right Subscapularis  [] Left [] Right Coracobrachialis  [] Left [] Right Biceps Brachii  [] Left [] Right Deltoid: Anterior / Medial / Posterior  [] Left [] Right Brachialis  [] Left [] Right Triceps  [] Left [] Right Brachioradialis  [] Left [] Right Extensor Carpi Radialis Brevis / [employer] Radialis Longus    [] Left [] Right  Extensor digitorum  [] Left [] Right Supinator / Pronator Teres  [] Left [] Right Flexor Carpi Radialis/ Flexor Carpi Ulnaris   [] Left [] Right  Flexor Digitorum Superficialis/ Flexor Digitorum Profundus  [] Left [] Right Flexor Pollicis Longus / Flexor Pollicis Brevis / Palmaris Longus  [] Left [] Right Abductor Pollicis Longus / Abductor Pollicis Brevis  [] Left [] Right Opponens Pollicis / Adductor Pollicis  [] Left [] Right Dorsal / Palmar Interossei / Lumbricalis  [] Left [] Right Abductor Digiti Minimi / Opponens Digiti Minimi    Patient's response to today's treatment:  [x] Latent Twitch Response     [] Muscle relaxation [] Pain Relief  [x] Post needling soreness    [x] without complications  [] Increased Range of Motion   [] Decreased headaches    [] Decreased Tinnitus  [] Other:     Performed by: Daria Ewing, PT       With   [] TE   [] TA   [] neuro   [] other: Patient Education: [x] Review HEP    [] Progressed/Changed HEP based on:   [] positioning   [] body mechanics   [] transfers   [] heat/ice application    [] other:      Other Objective/Functional Measures: added single arm row and reach with TRX, swiss ball push ups     Pain Level (0-10 scale) post treatment: 1/10    ASSESSMENT/Changes in Function: Patient reports good reduction in pain/tightness over the past 1-2 weeks, with improving ease of lifting. 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      Patient will continue to benefit from skilled PT services to modify and progress therapeutic interventions, address functional mobility deficits, address ROM deficits, address strength deficits, analyze and address soft tissue restrictions, analyze and cue movement patterns, analyze and modify body mechanics/ergonomics, assess and modify postural abnormalities and instruct in home and community integration to attain remaining goals. []  See Plan of Care  []  See progress note/recertification  []  See Discharge Summary         Progress towards goals / Updated goals:  1. Patient to increase FOTO score to 85 pts in order to indicate improved functional abilities and QOL.   Status at last note/certification: 56 pts  Current status: reassess at MD note  2. Patient to increase AROM of c/s to 45deg B side bending and B equal rotation to facilitate ADLS. Status at last note/certification: rotation Left 64 deg Right 60 deg, side bending Left 25 deg Right 35 deg  Current status: progressing, no noticeable change since progress note 2/22/21  3. Patient to report ability to lift children with no increase in sx in neck or t/s in order to improve ease of caring for children.   Status at last note/certification: reports improving ease but continued pain/strain  Current status: progressing, less pain but some continued difficulty 2/22/21  4. Patient to report at least 70% improvement in functional abilities in order to return to premorbid activity levels.   Status at last note/certification: 75%   Current status: reassess at MD note    PLAN  [x]  Upgrade activities as tolerated     [x]  Continue plan of care  []  Update interventions per flow sheet       []  Discharge due to:_  []  Other:_      Jerome Pike, PT 2/22/2021  9:16 AM    Future Appointments   Date Time Provider Jordyn Ennis   3/1/2021  3:00 PM Kierra Blanco, DPT [facility]   3/8/2021 11:30 AM Maximo Vogel, PT [facility]   3/15/2021 11:30 AM TOREY SolT [facility]

## 2021-03-01 ENCOUNTER — HOSPITAL ENCOUNTER (OUTPATIENT)
Dept: PHYSICAL THERAPY | Age: 36
Discharge: HOME | End: 2021-03-01
Payer: COMMERCIAL

## 2021-03-01 PROCEDURE — 20561 NDL INSJ W/O NJX 3+ MUSC: CPT | Performed by: PHYSICAL THERAPIST

## 2021-03-01 PROCEDURE — 97140 MANUAL THERAPY 1/> REGIONS: CPT | Performed by: PHYSICAL THERAPIST

## 2021-03-01 PROCEDURE — 97110 THERAPEUTIC EXERCISES: CPT | Performed by: PHYSICAL THERAPIST

## 2021-03-01 PROCEDURE — 97014 ELECTRIC STIMULATION THERAPY: CPT | Performed by: PHYSICAL THERAPIST

## 2021-03-01 NOTE — PROGRESS NOTES
PT DAILY TREATMENT NOTE 11-    Patient Name: Shiva Simental  Date:3/1/2021  : 1985  [x]  Patient  Verified  Payor: Lisset Ortega / Plan: Daniel Fonseca / Product Type: HMO /    In time:301  Out time:401  Total Treatment Time (min): 60  Visit #: 2 of 4-8    Medicare/BCBS Only   Total Timed Codes (min):  35 1:1 Treatment Time:  20       Treatment Area: Thoracic spine pain [M54.6]    SUBJECTIVE  Pain Level (0-10 scale): 0/10  Any medication changes, allergies to medications, adverse drug reactions, diagnosis change, or new procedure performed?: [x] No    [] Yes (see summary sheet for update)  Subjective functional status/changes:   [] No changes reported  No pain I just feel tight. He reports that he was able to go golfing this weekend and he attributes some tightness to that. Pt reports that he is able to lift his children without pain just tightness.      OBJECTIVE    Modality rationale: decrease pain and increase tissue extensibility to improve the patients ability to reduce soreness after exercises    Min Type Additional Details   15 [x] Estim:  [x]Unatt       []IFC  []Premod                        [x]Other: HV []w/ice   [x]w/heat  Position:prone  Location R rhomboids    [] Estim: []Att    []TENS instruct  []NMES                    []Other:  []w/US   []w/ice   []w/heat  Position:  Location:    []  Traction: [] Cervical       []Lumbar                       [] Prone          []Supine                       []Intermittent   []Continuous Lbs:  [] before manual  [] after manual    []  Ultrasound: []Continuous   [] Pulsed                           []1MHz   []3MHz W/cm2:  Location:    []  Iontophoresis with dexamethasone         Location: [] Take home patch   [] In clinic    []  Ice     []  heat  []  Ice massage  []  Laser   []  Anodyne Position:  Location:    []  Laser with stim  []  Other:  Position:  Location:    []  Vasopneumatic Device Pressure:       [] lo [] med [] hi   Temperature: [] lo [] med [] hi [x] Skin assessment post-treatment:  [x]intact []redness- no adverse reaction    []redness - adverse reaction:     25 min Neuromuscular Re-education:  [x]  See flow sheet :   Rationale: increase strength, improve coordination and increase proprioception  to improve the patients ability to improve scapular stability with functional tasks    10 min Manual Therapy:  Prone STM/TPR to Right UT/rhomboids/levator scapulae    Rationale: decrease pain, increase ROM, increase tissue extensibility and decrease trigger points to reduce pain with lifting     10 min Dry Needling:   []  CPT 86572:  needle insertion(s) without injection(s); 1 or 2 muscle(s)  [x]  CPT 55641:  needle insertion(s) without injection(s); 3 or more muscles. Rationale: decrease pain, increase ROM, increase tissue extensibility and decrease trigger points to improve mechanics with functional tasks    Dry Needling Procedure Note    Procedure: An intramuscular manual therapy (dry needling) and a neuro-muscular re-education treatment was done to deactivate myofascial trigger points with a 30 gauge filament needle under aseptic technique. Indications:  [x] Myofascial pain and dysfunction [] Muscled spasms  [x] Myalgia/myositis   [] Muscle cramps  [x] Muscle imbalances  [] Temporomandibular Dysfunction  [] Other:    Chart reviewed for the following:  Britta LEE DPT, have reviewed the medical history, summary list and precautions/contraindications for [employer].   TIME OUT performed immediately prior to start of procedure:  Britta LEE DPT, have performed the following reviews on [employer] prior to the start of the session:      [x] Verified patient identification by name and date of birth    [x] Agreement on all muscles being treated was verified   [x] Purpose of dry needling, side effects, possible complications, risks and benefits were explained to the patient   [x] Procedure site(s) verified  [x] Patient was positioned for comfort and draped for privacy  [x] Informed Consent was signed (initial visit) and verified verbally (subsequent visits)  [x] Patient was instructed on the need to report the use of blood thinners and/or immunosuppressant medications  [x] How to respond to possible adverse effects of treatment  [x] Self treatment of post needling soreness: ice, heat (moist heat, heat wraps) and stretching  [x] Opportunity was given to ask any questions, all questions were answered            Time: 346 pm  Date of procedure: 3/1/2021  Treatment: The following muscles were treated today with intramuscular dry needling  [] Left [] Right Masster  [] Left [] Right Temporalis  [] Left [] Right Zygomaticus Major / Minor  [] Left [] Right Lateral Pterygoid  [] Left [] Right Medial Pterygoid  [] Left [] Right Digastric Post / Anterior Belly  [] Left [] Right Sternocleidomastoid  [] Left [] Right Scalene Anterior / Medial / Posterior  [] Left [] Right Extra Laryngeal Muscles  [] Left [x] Right Upper Trapezius  [] Left [] Right Middle Trapezius  [] Left [] Right Lower Trapezius  [] Left [] Right Oblique Capitis Inferior  [] Left [] Right Splenius Capitis / Cervicis  [] Left [] Right Semispinalis: Capitis / Cervicis  [] Left [] Right Multifidi / Rotatores Cervicis / Thoracic  [] Left [] Right Longissimus Thoracis / Illiocostalis  [] Left [x] Right Levator Scapulae  [] Left [] Right Supraspinatus / Infraspinatus  [] Left [] Right Teres Major / Minor  [] Left [x] Right Rhomboids / Serratus posterior superior  [] Left [] Right Pectoralis Major / Minor  [] Left [] Right Serratus Anterior  [] Left [] Right Latissimus Dorsi  [] Left [x] Right Subscapularis  [] Left [] Right Coracobrachialis  [] Left [] Right Biceps Brachii  [] Left [] Right Deltoid: Anterior / Medial / Posterior  [] Left [] Right Brachialis  [] Left [] Right Triceps  [] Left [] Right Brachioradialis  [] Left [] Right Extensor Carpi Radialis Brevis / Extensor Carpi Radialis Longus    [] Left [] Right  Extensor digitorum  [] Left [] Right Supinator / Pronator Teres  [] Left [] Right Flexor Carpi Radialis/ Flexor Carpi Ulnaris   [] Left [] Right  Flexor Digitorum Superficialis/ Flexor Digitorum Profundus  [] Left [] Right Flexor Pollicis Longus / Flexor Pollicis Brevis / Palmaris Longus  [] Left [] Right Abductor Pollicis Longus / Abductor Pollicis Brevis  [] Left [] Right Opponens Pollicis / Adductor Pollicis  [] Left [] Right Dorsal / Palmar Interossei / Lumbricalis  [] Left [] Right Abductor Digiti Minimi / Opponens Digiti Minimi    Patient's response to today's treatment:  [x] Latent Twitch Response     [] Muscle relaxation [] Pain Relief  [x] Post needling soreness    [x] without complications  [] Increased Range of Motion   [] Decreased headaches    [] Decreased Tinnitus  [] Other:     Performed by: Marta Guerin DPT       With   [] TE   [] TA   [] neuro   [] other: Patient Education: [x] Review HEP    [] Progressed/Changed HEP based on:   [] positioning   [] body mechanics   [] transfers   [] heat/ice application    [] other:      Other Objective/Functional Measures:      Pain Level (0-10 scale) post treatment: sore/10    ASSESSMENT/Changes in Function:   Pt tolerated all therex without increased pain. He was sore due to the nature of IMT. He continues to have tightness in the above needled muscles however, improving with current treatment. Continue to progress rotation TS strength to decrease symptoms and return to all activities symptom free.    Patient will continue to benefit from skilled PT services to modify and progress therapeutic interventions, address functional mobility deficits, address ROM deficits, address strength deficits, analyze and address soft tissue restrictions, analyze and cue movement patterns, analyze and modify body mechanics/ergonomics, assess and modify postural abnormalities and instruct in home and community integration to attain remaining goals.     []  See Plan of Care  []  See progress note/recertification  []  See Discharge Summary         Progress towards goals / Updated goals:  1. Patient to increase FOTO score to 85 pts in order to indicate improved functional abilities and QOL.   Status at last note/certification: 56 pts  Current status: reassess at MD note  2. Patient to increase AROM of c/s to 45deg B side bending and B equal rotation to facilitate ADLS. Status at last note/certification: rotation Left 64 deg Right 60 deg, side bending Left 25 deg Right 35 deg  Current status: progressing, no noticeable change since progress note 2/22/21  3. Patient to report ability to lift children with no increase in sx in neck or t/s in order to improve ease of caring for children.   Status at last note/certification: reports improving ease but continued pain/strain  Current status:no pain just tightness 3/1/21   4. Patient to report at least 70% improvement in functional abilities in order to return to premorbid activity levels.   Status at last note/certification: 62%   Current status: reassess at MD note    PLAN  [x]  Upgrade activities as tolerated     [x]  Continue plan of care  []  Update interventions per flow sheet       []  Discharge due to:_  []  Other:_      Glory Patient, OTREYT 3/1/2021  409 PM    Future Appointments   Date Time Provider Jordyn Ennis   3/8/2021 11:30 AM Марина Sue, YUMIKO [facility]   3/15/2021 11:30 AM Марина Sue DPT [facility]

## 2021-03-08 ENCOUNTER — HOSPITAL ENCOUNTER (OUTPATIENT)
Dept: PHYSICAL THERAPY | Age: 36
Discharge: HOME | End: 2021-03-08
Payer: COMMERCIAL

## 2021-03-08 PROCEDURE — 97014 ELECTRIC STIMULATION THERAPY: CPT | Performed by: PHYSICAL THERAPIST

## 2021-03-08 PROCEDURE — 97140 MANUAL THERAPY 1/> REGIONS: CPT | Performed by: PHYSICAL THERAPIST

## 2021-03-08 PROCEDURE — 20560 NDL INSJ W/O NJX 1 OR 2 MUSC: CPT | Performed by: PHYSICAL THERAPIST

## 2021-03-08 PROCEDURE — 97110 THERAPEUTIC EXERCISES: CPT | Performed by: PHYSICAL THERAPIST

## 2021-03-15 ENCOUNTER — HOSPITAL ENCOUNTER (OUTPATIENT)
Dept: PHYSICAL THERAPY | Age: 36
Discharge: HOME | End: 2021-03-15
Payer: COMMERCIAL

## 2021-03-15 PROCEDURE — 97014 ELECTRIC STIMULATION THERAPY: CPT | Performed by: PHYSICAL THERAPIST

## 2021-03-15 PROCEDURE — 97110 THERAPEUTIC EXERCISES: CPT | Performed by: PHYSICAL THERAPIST
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
## 2021-03-15 NOTE — PROGRESS NOTES
PT DAILY TREATMENT NOTE 11    Patient Name: Ismael Sánchez  Date:3/15/2021  : 1985  [x]  Patient  Verified  Payor: Chandrika Landers / Plan: Arnulfo Herrera / Product Type: HMO /    In time:1135  Out time:1222  Total Treatment Time (min):47   Visit #: 4 of 4-8    Medicare/BCBS Only   Total Timed Codes (min):  32 1:1 Treatment Time:  32       Treatment Area: Thoracic spine pain [M54.6]    SUBJECTIVE  Pain Level (0-10 scale): tight  Any medication changes, allergies to medications, adverse drug reactions, diagnosis change, or new procedure performed?: [x] No    [] Yes (see summary sheet for update)  Subjective functional status/changes:   [] No changes reported  Pt reports 85-90% improvement of symptoms since SOC. He reports improvements in overall tightness in the R rhomboids, overall mobility, and return to basic life activities. He reports that he is able to lift his children without difficulty. He reports that he can do most things just some activities he has to think about. He was able to chop wood and played golf without restrictions this weekend with just a little tightness.      OBJECTIVE    Modality rationale: decrease pain and increase tissue extensibility to improve the patients ability to reduce soreness after exercises    Min Type Additional Details   15 [x] Estim:  [x]Unatt       []IFC  []Premod                        [x]Other: HV []w/ice   [x]w/heat  Position:prone  Location R rhomboids    [] Estim: []Att    []TENS instruct  []NMES                    []Other:  []w/US   []w/ice   []w/heat  Position:  Location:    []  Traction: [] Cervical       []Lumbar                       [] Prone          []Supine                       []Intermittent   []Continuous Lbs:  [] before manual  [] after manual    []  Ultrasound: []Continuous   [] Pulsed                           []1MHz   []3MHz W/cm2:  Location:    []  Iontophoresis with dexamethasone         Location: [] Take home patch   [] In clinic    []  Ice []  heat  []  Ice massage  []  Laser   []  Anodyne Position:  Location:    []  Laser with stim  []  Other:  Position:  Location:    []  Vasopneumatic Device Pressure:       [] lo [] med [] hi   Temperature: [] lo [] med [] hi   [x] Skin assessment post-treatment:  [x]intact []redness- no adverse reaction    []redness - adverse reaction:     32 min Neuromuscular Re-education:  [x]  See flow sheet : PT reassessment, FOTO, updated HEP    Rationale: increase strength, improve coordination and increase proprioception  to improve the patients ability to improve scapular stability with functional tasks      With   [] TE   [] TA   [] neuro   [] other: Patient Education: [x] Review HEP    [] Progressed/Changed HEP based on:   [] positioning   [] body mechanics   [] transfers   [] heat/ice application    [] other:      Other Objective/Functional Measures:    Cervical ROM: B SB: 40 deg increased tightness to the L,  Rotation: L 75 deg, R deg   Scapular strength: 5/5 except LT 4/5  FOTO improved 86/100 from 56 at last assessment    Pain Level (0-10 scale) post treatment: 0/10    ASSESSMENT/Changes in Function:   Upon reassessment, pt presents with improved cervical rotation and scapular strength improving ability to perform abilty to perform functional activities around the home without symptoms. At this time pt is able to manage symptoms IND and has returned to all activities without pain. He is to trial IND management for 30 days and will return for a 1 FU for formal reassessment/continuation of care if needed.       Patient will continue to benefit from skilled PT services to modify and progress therapeutic interventions, address functional mobility deficits, address ROM deficits, address strength deficits, analyze and address soft tissue restrictions, analyze and cue movement patterns, analyze and modify body mechanics/ergonomics, assess and modify postural abnormalities and instruct in home and community integration to attain remaining goals. []  See Plan of Care  []  See progress note/recertification  []  See Discharge Summary         Progress towards goals / Updated goals:  1. Patient to increase FOTO score to 85 pts in order to indicate improved functional abilities and QOL.   Status at last note/certification: 56 pts  Current status: met 86/100 3/15/21  2. Patient to increase AROM of c/s to 45deg B side bending and B equal rotation to facilitate ADLS. Status at last note/certification: rotation Left 64 deg Right 60 deg, side bending Left 25 deg Right 35 deg  Current status: met 3/15/21  3. Patient to report ability to lift children with no increase in sx in neck or t/s in order to improve ease of caring for children.   Status at last note/certification: reports improving ease but continued pain/strain  Current status: met 3/15/21  4. Patient to report at least 70% improvement in functional abilities in order to return to premorbid activity levels. Status at last note/certification: 84%   Current status: met 85-90% improvement     PLAN  [x]  Upgrade activities as tolerated     [x]  Continue plan of care  []  Update interventions per flow sheet       []  Discharge due to:_  [x]  Other: Pt on hold for 30 days to trial IND management     Christopher Glass DPT 3/15/2021  202  PM    No future appointments.
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
## 2022-09-14 NOTE — PROGRESS NOTES
PT DAILY TREATMENT NOTE 11    Patient Name: Kyle Keenan  Date:3/8/2021  : 1985  [x]  Patient  Verified  Payor: Pedro Damico / Plan: Saige Arroyo / Product Type: HMO /    In time:1130  Out time:1235  Total Treatment Time (min): 65  Visit #: 3 of -8    Medicare/BCBS Only   Total Timed Codes (min):  45 1:1 Treatment Time:  43       Treatment Area: Thoracic spine pain [M54.6]    SUBJECTIVE  Pain Level (0-10 scale): tight  Any medication changes, allergies to medications, adverse drug reactions, diagnosis change, or new procedure performed?: [x] No    [] Yes (see summary sheet for update)  Subjective functional status/changes:   [] No changes reported  Pt reports that he returned to working out upper body today. He reports that he is doing so much better. He was able to chop wood this weekend and was just muscle sore.      OBJECTIVE    Modality rationale: decrease pain and increase tissue extensibility to improve the patients ability to reduce soreness after exercises    Min Type Additional Details   15 [x] Estim:  [x]Unatt       []IFC  []Premod                        [x]Other: HV []w/ice   [x]w/heat  Position:prone  Location R rhomboids    [] Estim: []Att    []TENS instruct  []NMES                    []Other:  []w/US   []w/ice   []w/heat  Position:  Location:    []  Traction: [] Cervical       []Lumbar                       [] Prone          []Supine                       []Intermittent   []Continuous Lbs:  [] before manual  [] after manual    []  Ultrasound: []Continuous   [] Pulsed                           []1MHz   []3MHz W/cm2:  Location:    []  Iontophoresis with dexamethasone         Location: [] Take home patch   [] In clinic    []  Ice     []  heat  []  Ice massage  []  Laser   []  Anodyne Position:  Location:    []  Laser with stim  []  Other:  Position:  Location:    []  Vasopneumatic Device Pressure:       [] lo [] med [] hi   Temperature: [] lo [] med [] hi   [x] Skin assessment [Good] : ~his/her~  mood as  good [No falls in past year] : Patient reported no falls in the past year post-treatment:  [x]intact []redness- no adverse reaction    []redness  adverse reaction:     35 min Neuromuscular Re-education:  [x]  See flow sheet : added thread the needle   Rationale: increase strength, improve coordination and increase proprioception  to improve the patients ability to improve scapular stability with functional tasks    10 min Manual Therapy:  Prone STM/TPR to Right UT/rhomboids/levator scapulae    Rationale: decrease pain, increase ROM, increase tissue extensibility and decrease trigger points to reduce pain with lifting     5 min Dry Needling:   [x]  CPT 64356:  needle insertion(s) without injection(s); 1 or 2 muscle(s)  []  CPT 39377:  needle insertion(s) without injection(s); 3 or more muscles. Rationale: decrease pain, increase ROM, increase tissue extensibility and decrease trigger points to improve mechanics with functional tasks    Dry Needling Procedure Note    Procedure: An intramuscular manual therapy (dry needling) and a neuro-muscular re-education treatment was done to deactivate myofascial trigger points with a 30 gauge filament needle under aseptic technique. Indications:  [x] Myofascial pain and dysfunction [] Muscled spasms  [x] Myalgia/myositis   [] Muscle cramps  [x] Muscle imbalances  [] Temporomandibular Dysfunction  [] Other:    Chart reviewed for the following:  Ruben LEE DPT, have reviewed the medical history, summary list and precautions/contraindications for [employer].   TIME OUT performed immediately prior to start of procedure:  Ruben LEE DPT, have performed the following reviews on [employer] prior to the start of the session:      [x] Verified patient identification by name and date of birth    [x] Agreement on all muscles being treated was verified   [x] Purpose of dry needling, side effects, possible complications, risks and benefits were explained to the patient   [x] Procedure site(s) verified  [x] Patient was positioned [0] : 2) Feeling down, depressed, or hopeless: Not at all (0) for comfort and draped for privacy  [x] Informed Consent was signed (initial visit) and verified verbally (subsequent visits)  [x] Patient was instructed on the need to report the use of blood thinners and/or immunosuppressant medications  [x] How to respond to possible adverse effects of treatment  [x] Self treatment of post needling soreness: ice, heat (moist heat, heat wraps) and stretching  [x] Opportunity was given to ask any questions, all questions were answered            Time: 346 pm  Date of procedure: 3/8/2021  Treatment: The following muscles were treated today with intramuscular dry needling  [] Left [] Right Masster  [] Left [] Right Temporalis  [] Left [] Right Zygomaticus Major / Minor  [] Left [] Right Lateral Pterygoid  [] Left [] Right Medial Pterygoid  [] Left [] Right Digastric Post / Anterior Belly  [] Left [] Right Sternocleidomastoid  [] Left [] Right Scalene Anterior / Medial / Posterior  [] Left [] Right Extra Laryngeal Muscles  [] Left [] Right Upper Trapezius  [] Left [x] Right Middle Trapezius  [] Left [] Right Lower Trapezius  [] Left [] Right Oblique Capitis Inferior  [] Left [] Right Splenius Capitis / Cervicis  [] Left [] Right Semispinalis: Capitis / Cervicis  [] Left [] Right Multifidi / Rotatores Cervicis / Thoracic  [] Left [] Right Longissimus Thoracis / Illiocostalis  [] Left [] Right Levator Scapulae  [] Left [] Right Supraspinatus / Infraspinatus  [] Left [] Right Teres Major / Minor  [] Left [x] Right Rhomboids / Serratus posterior superior  [] Left [] Right Pectoralis Major / Minor  [] Left [] Right Serratus Anterior  [] Left [] Right Latissimus Dorsi  [] Left [] Right Subscapularis  [] Left [] Right Coracobrachialis  [] Left [] Right Biceps Brachii  [] Left [] Right Deltoid: Anterior / Medial / Posterior  [] Left [] Right Brachialis  [] Left [] Right Triceps  [] Left [] Right Brachioradialis  [] Left [] Right Extensor Carpi Radialis Brevis / Extensor Carpi Radialis Longus [Patient reported mammogram was normal] : Patient reported mammogram was normal [] Left [] Right  Extensor digitorum  [] Left [] Right Supinator / Pronator Teres  [] Left [] Right Flexor Carpi Radialis/ Flexor Carpi Ulnaris   [] Left [] Right  Flexor Digitorum Superficialis/ Flexor Digitorum Profundus  [] Left [] Right Flexor Pollicis Longus / Flexor Pollicis Brevis / Palmaris Longus  [] Left [] Right Abductor Pollicis Longus / Abductor Pollicis Brevis  [] Left [] Right Opponens Pollicis / Adductor Pollicis  [] Left [] Right Dorsal / Palmar Interossei / Lumbricalis  [] Left [] Right Abductor Digiti Minimi / Opponens Digiti Minimi    Patient's response to today's treatment:  [x] Latent Twitch Response     [] Muscle relaxation [] Pain Relief  [x] Post needling soreness    [x] without complications  [] Increased Range of Motion   [] Decreased headaches    [] Decreased Tinnitus  [] Other:     Performed by: TOREY CrabtreeT       With   [] TE   [] TA   [] neuro   [] other: Patient Education: [x] Review HEP    [] Progressed/Changed HEP based on:   [] positioning   [] body mechanics   [] transfers   [] heat/ice application    [] other:      Other Objective/Functional Measures:      Pain Level (0-10 scale) post treatment: sore/10    ASSESSMENT/Changes in Function:   + response in the above needled muscles reduced after manual interventions. Reassess next session for reassessment. Pt progressing nicely and is slowly returning back to functional activities with reduced pain. Patient will continue to benefit from skilled PT services to modify and progress therapeutic interventions, address functional mobility deficits, address ROM deficits, address strength deficits, analyze and address soft tissue restrictions, analyze and cue movement patterns, analyze and modify body mechanics/ergonomics, assess and modify postural abnormalities and instruct in home and community integration to attain remaining goals.      []  See Plan of Care  []  See progress note/recertification  []  See Discharge Summary         Progress towards goals / Updated goals:  1. Patient to increase FOTO score to 85 pts in order to indicate improved functional abilities and QOL.   Status at last note/certification: 56 pts  Current status: reassess at MD note  2. Patient to increase AROM of c/s to 45deg B side bending and B equal rotation to facilitate ADLS. Status at last note/certification: rotation Left 64 deg Right 60 deg, side bending Left 25 deg Right 35 deg  Current status: progressing, no noticeable change since progress note 2/22/21  3. Patient to report ability to lift children with no increase in sx in neck or t/s in order to improve ease of caring for children.   Status at last note/certification: reports improving ease but continued pain/strain  Current status:no pain just tightness 3/1/21   4. Patient to report at least 70% improvement in functional abilities in order to return to premorbid activity levels.   Status at last note/certification: 14%   Current status: reassess at MD note    PLAN  [x]  Upgrade activities as tolerated     [x]  Continue plan of care  []  Update interventions per flow sheet       []  Discharge due to:_  [x]  Other: check goals for reassessment DARWIN Faulkner DPT 3/8/2021  148 PM    Future Appointments   Date Time Provider Jordyn Ennis   3/15/2021 11:30 AM Brenda Mistry DPT [facility] [Patient reported colonoscopy was normal] : Patient reported colonoscopy was normal [MammogramDate] : 09/22 [ColonoscopyDate] : 04/21